# Patient Record
Sex: MALE | Race: WHITE | URBAN - METROPOLITAN AREA
[De-identification: names, ages, dates, MRNs, and addresses within clinical notes are randomized per-mention and may not be internally consistent; named-entity substitution may affect disease eponyms.]

---

## 2022-01-12 ENCOUNTER — OFFICE VISIT (OUTPATIENT)
Dept: URBAN - METROPOLITAN AREA CLINIC 10 | Facility: CLINIC | Age: 47
End: 2022-01-12
Payer: COMMERCIAL

## 2022-01-12 DIAGNOSIS — B02.33 HERPES ZOSTER KERATOCONJUNCTIVITIS: Primary | ICD-10-CM

## 2022-01-12 PROCEDURE — 99204 OFFICE O/P NEW MOD 45 MIN: CPT | Performed by: STUDENT IN AN ORGANIZED HEALTH CARE EDUCATION/TRAINING PROGRAM

## 2022-01-12 ASSESSMENT — INTRAOCULAR PRESSURE
OD: 14
OS: 16

## 2022-01-12 NOTE — IMPRESSION/PLAN
Impression: Herpes zoster keratoconjunctivitis: B02.33. Plan: Patient educated on condition, PEK superior out of visual axis Continue Valacyclovir PO

RTC 7-10 for IOP and brief f/u

## 2022-01-20 ENCOUNTER — OFFICE VISIT (OUTPATIENT)
Dept: URBAN - METROPOLITAN AREA CLINIC 10 | Facility: CLINIC | Age: 47
End: 2022-01-20
Payer: COMMERCIAL

## 2022-01-20 DIAGNOSIS — H05.242 CONSTANT EXOPHTHALMOS, LEFT EYE: ICD-10-CM

## 2022-01-20 PROCEDURE — 99213 OFFICE O/P EST LOW 20 MIN: CPT | Performed by: STUDENT IN AN ORGANIZED HEALTH CARE EDUCATION/TRAINING PROGRAM

## 2022-01-20 ASSESSMENT — INTRAOCULAR PRESSURE
OD: 15
OS: 15

## 2022-01-20 NOTE — IMPRESSION/PLAN
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 Plan: Patient unsure if long-standing or if new, patient reports normal Lab work and findings at last PCP exam in Jan. 2022. Patient leaving town, patient will f/u with provider out of town.

## 2022-01-20 NOTE — IMPRESSION/PLAN
Impression: Herpes zoster keratoconjunctivitis: B02.33. Plan: Patient educated on condition, scar superior, infection resolved today.